# Patient Record
Sex: MALE | Race: OTHER | Employment: FULL TIME | URBAN - METROPOLITAN AREA
[De-identification: names, ages, dates, MRNs, and addresses within clinical notes are randomized per-mention and may not be internally consistent; named-entity substitution may affect disease eponyms.]

---

## 2017-03-27 ENCOUNTER — HOSPITAL ENCOUNTER (OUTPATIENT)
Dept: GENERAL RADIOLOGY | Age: 67
Discharge: HOME | End: 2017-03-27
Attending: PHYSICIAN ASSISTANT

## 2017-03-27 ENCOUNTER — OFFICE VISIT (OUTPATIENT)
Dept: OCCUPATIONAL MEDICINE | Age: 67
End: 2017-03-27
Attending: PHYSICIAN ASSISTANT

## 2017-03-27 DIAGNOSIS — S46.912A LEFT SHOULDER STRAIN, INITIAL ENCOUNTER: ICD-10-CM

## 2017-03-27 DIAGNOSIS — S46.912A LEFT SHOULDER STRAIN, INITIAL ENCOUNTER: Primary | ICD-10-CM

## 2017-03-27 PROCEDURE — 73030 X-RAY EXAM OF SHOULDER: CPT

## 2017-03-27 RX ORDER — CYCLOBENZAPRINE HCL 10 MG
10 TABLET ORAL NIGHTLY
Qty: 20 TABLET | Refills: 0 | Status: SHIPPED | OUTPATIENT
Start: 2017-03-27 | End: 2018-01-22

## 2017-03-30 ENCOUNTER — OFFICE VISIT (OUTPATIENT)
Dept: OCCUPATIONAL MEDICINE | Age: 67
End: 2017-03-30
Attending: PHYSICIAN ASSISTANT
Payer: OTHER MISCELLANEOUS

## 2017-03-30 DIAGNOSIS — M24.812 INTERNAL DERANGEMENT OF LEFT SHOULDER: ICD-10-CM

## 2017-03-30 DIAGNOSIS — S46.912D LEFT SHOULDER STRAIN, SUBSEQUENT ENCOUNTER: Primary | ICD-10-CM

## 2017-04-25 ENCOUNTER — OFFICE VISIT (OUTPATIENT)
Dept: OCCUPATIONAL MEDICINE | Age: 67
End: 2017-04-25
Attending: FAMILY MEDICINE

## 2017-04-25 DIAGNOSIS — M75.102 TEAR OF LEFT SUPRASPINATUS TENDON: Primary | ICD-10-CM

## 2017-04-25 DIAGNOSIS — M67.922 TENDINOPATHY OF LEFT BICEPS: ICD-10-CM

## 2017-04-25 DIAGNOSIS — M67.912 TENDINOPATHY OF ROTATOR CUFF, LEFT: ICD-10-CM

## 2017-05-31 PROBLEM — M75.02 ADHESIVE CAPSULITIS OF LEFT SHOULDER: Status: ACTIVE | Noted: 2017-05-31

## 2017-05-31 PROBLEM — M19.012 PRIMARY OSTEOARTHRITIS OF LEFT SHOULDER: Status: ACTIVE | Noted: 2017-05-31

## 2017-05-31 PROBLEM — M75.122 COMPLETE TEAR OF LEFT ROTATOR CUFF: Status: ACTIVE | Noted: 2017-05-31

## 2017-06-06 ENCOUNTER — OFFICE VISIT (OUTPATIENT)
Dept: PHYSICAL THERAPY | Age: 67
End: 2017-06-06
Attending: ORTHOPAEDIC SURGERY
Payer: OTHER MISCELLANEOUS

## 2017-06-06 DIAGNOSIS — M19.012 PRIMARY OSTEOARTHRITIS OF LEFT SHOULDER: ICD-10-CM

## 2017-06-06 DIAGNOSIS — E10.8 TYPE 1 DIABETES MELLITUS WITH COMPLICATION (HCC): ICD-10-CM

## 2017-06-06 DIAGNOSIS — M75.02 ADHESIVE CAPSULITIS OF LEFT SHOULDER: ICD-10-CM

## 2017-06-06 DIAGNOSIS — G89.29 CHRONIC LEFT SHOULDER PAIN: Primary | ICD-10-CM

## 2017-06-06 DIAGNOSIS — M25.512 CHRONIC LEFT SHOULDER PAIN: Primary | ICD-10-CM

## 2017-06-06 DIAGNOSIS — M75.122 COMPLETE TEAR OF LEFT ROTATOR CUFF: ICD-10-CM

## 2017-06-06 PROCEDURE — 97110 THERAPEUTIC EXERCISES: CPT

## 2017-06-06 PROCEDURE — 97162 PT EVAL MOD COMPLEX 30 MIN: CPT

## 2017-06-06 NOTE — PROGRESS NOTES
UPPER EXTREMITY EVALUATION:   Referring Physician: Dr. Janette Mcleod  Diagnosis: chronic left shoulder pain, type 1 DM, complete tear of L RC, adhesive capsulitis of L shoulder; OA of L shoulder     Date of Service: 6/6/2017   *daughter present for translation du drop attack, dysphagia, dysarthria, diplopia  Special Qs: R handed, No symptoms with cough, sneeze. No clicking/popping/locking/catching    Physician Appointment:6/28/17   Imaging: XRAY: Unremarkable x-rays of the left shoulder.       ASSESSMENT  Pt has sym bilaterally  Resisted Abduction with ER: R: 5/5; L: 3+/5 *pain and compensations    Today’s Treatment and Response: Patient education provided on pathology, HEP recommendations, abduction AAROM in sidelying, impairments and goals for therapy, weakness.   Pa referral. Please co-sign or sign and return this letter via fax as soon as possible to [phone].  If you have any questions, please contact me at Dept: [phone]    Sincerely,  Electronically signed by therapist: Alistair Bruno, PT    [de-identified] ce

## 2017-06-07 ENCOUNTER — OFFICE VISIT (OUTPATIENT)
Dept: PHYSICAL THERAPY | Age: 67
End: 2017-06-07
Attending: ORTHOPAEDIC SURGERY
Payer: OTHER MISCELLANEOUS

## 2017-06-07 PROCEDURE — 97110 THERAPEUTIC EXERCISES: CPT

## 2017-06-07 NOTE — PROGRESS NOTES
Dx: chronic left shoulder pain, type 1 DM, complete tear of L RC, adhesive capsulitis of L shoulder; OA of L shoulder              Authorized # of Visits:  12 auth w/c         Next MD visit: 6/28/17    Fall Risk: standard        Precautions: none weight; 10 reps 1 lbs *small amt pain         Standing yellow band extension 20 reps         Seated thoracic towel extension 10 sec hold 10 reps          Pt education: mechanics for exercises, benefits of exercises         HEP (written handouts provided) a

## 2017-06-08 ENCOUNTER — OFFICE VISIT (OUTPATIENT)
Dept: PHYSICAL THERAPY | Age: 67
End: 2017-06-08
Attending: ORTHOPAEDIC SURGERY
Payer: OTHER MISCELLANEOUS

## 2017-06-08 PROCEDURE — 97110 THERAPEUTIC EXERCISES: CPT

## 2017-06-08 NOTE — PROGRESS NOTES
Dx: chronic left shoulder pain, type 1 DM, complete tear of L RC, adhesive capsulitis of L shoulder; OA of L shoulder              Authorized # of Visits:  12 auth w/c           Next MD visit: 6/28/17   Fall Risk: standard         Precautions: none this course of care. Thank you for your referral. If you have any questions, please contact me at Dept: [phone].     Sincerely,  Electronically signed by therapist: Rina Andrews PT      Date: 6/7/2017  Tx#: 2/12 Date: 6/8/2017   Tx#: 3/12 Date:

## 2017-06-12 ENCOUNTER — OFFICE VISIT (OUTPATIENT)
Dept: PHYSICAL THERAPY | Age: 67
End: 2017-06-12
Attending: ORTHOPAEDIC SURGERY
Payer: OTHER MISCELLANEOUS

## 2017-06-12 PROCEDURE — 97530 THERAPEUTIC ACTIVITIES: CPT

## 2017-06-12 PROCEDURE — 97110 THERAPEUTIC EXERCISES: CPT

## 2017-06-12 NOTE — PROGRESS NOTES
Dx: chronic left shoulder pain, type 1 DM, complete tear of L RC, adhesive capsulitis of L shoulder; OA of L shoulder              Authorized # of Visits:  12 auth w/c           Next MD visit: 6/28/17   Fall Risk: standard         Precautions: none Date:   Tx#: 6/ Date: Tx#: 7/ Date:    Tx#: 8/   Pulley warm up; flexion 3 min; abduction 3 min  Arm bike level 3 hills 5 in 2.5 min forward; 2.5 min back  X 5 min        Scapular retraction 10 reps x 2 sets X 10 reps  Pulley row 25 lbs 20 reps; 30 lbs 20

## 2017-06-13 ENCOUNTER — APPOINTMENT (OUTPATIENT)
Dept: PHYSICAL THERAPY | Age: 67
End: 2017-06-13
Attending: ORTHOPAEDIC SURGERY
Payer: OTHER MISCELLANEOUS

## 2017-06-15 ENCOUNTER — OFFICE VISIT (OUTPATIENT)
Dept: PHYSICAL THERAPY | Age: 67
End: 2017-06-15
Attending: ORTHOPAEDIC SURGERY
Payer: OTHER MISCELLANEOUS

## 2017-06-15 PROCEDURE — 97110 THERAPEUTIC EXERCISES: CPT

## 2017-06-15 PROCEDURE — 97530 THERAPEUTIC ACTIVITIES: CPT

## 2017-06-15 NOTE — PROGRESS NOTES
Dx: chronic left shoulder pain, type 1 DM, complete tear of L RC, adhesive capsulitis of L shoulder; OA of L shoulder              Authorized # of Visits:  12 auth w/c           Next MD visit: 6/28/17   Fall Risk: standard         Precautions: none course of care. Thank you for your referral. If you have any questions, please contact me at Dept: [phone].     Sincerely,  Electronically signed by therapist: Yanelis Ayers PT        Date: 6/7/2017  Tx#: 2/12 Date: 6/8/2017   Tx#: 3/12 Date: 6/12 serratus circles 10 CW 10 CCW R/L X 3 lbs 15 reps x 3 sets R/L Prone mid trap retraction 10 reps x 2 sets      HEP (written handouts provided) and pt education: Sidelying 38AROM abduction  6/7/2017: band resisted IR/ER- red band; scaption AROM- 1 lbs; band

## 2017-06-19 ENCOUNTER — OFFICE VISIT (OUTPATIENT)
Dept: PHYSICAL THERAPY | Age: 67
End: 2017-06-19
Attending: ORTHOPAEDIC SURGERY
Payer: OTHER MISCELLANEOUS

## 2017-06-19 PROCEDURE — 97110 THERAPEUTIC EXERCISES: CPT

## 2017-06-19 PROCEDURE — 97530 THERAPEUTIC ACTIVITIES: CPT

## 2017-06-19 NOTE — PROGRESS NOTES
Dx: chronic left shoulder pain, type 1 DM, complete tear of L RC, adhesive capsulitis of L shoulder; OA of L shoulder              Authorized # of Visits:  12 auth w/c           Next MD visit: 6/28/17   Fall Risk: standard         Precautions: none actively participate in planning and for this course of care. Thank you for your referral. If you have any questions, please contact me at Dept: [phone].     Sincerely,  Electronically signed by therapist: Poonam Connolly, PT        Date: 6/7/2017  T chest stretch 30 sec hold x 3 sets on the L Tricep overhead extension 2 lbs 10 reps x 2 sets     Seated thoracic towel extension 10 sec hold 10 reps  X 10 reps  Supine pec stretch 30 sec hold x 3 sets Prone scapular retraction 10 reps x 2 sets- VC and tact

## 2017-06-20 ENCOUNTER — APPOINTMENT (OUTPATIENT)
Dept: PHYSICAL THERAPY | Age: 67
End: 2017-06-20
Attending: ORTHOPAEDIC SURGERY
Payer: OTHER MISCELLANEOUS

## 2017-06-21 ENCOUNTER — OFFICE VISIT (OUTPATIENT)
Dept: PHYSICAL THERAPY | Age: 67
End: 2017-06-21
Attending: ORTHOPAEDIC SURGERY
Payer: OTHER MISCELLANEOUS

## 2017-06-21 PROCEDURE — 97530 THERAPEUTIC ACTIVITIES: CPT

## 2017-06-21 PROCEDURE — 97110 THERAPEUTIC EXERCISES: CPT

## 2017-06-21 NOTE — PROGRESS NOTES
Dx: chronic left shoulder pain, type 1 DM, complete tear of L RC, adhesive capsulitis of L shoulder; OA of L shoulder              Authorized # of Visits:  12 auth w/c           Next MD visit: 6/28/17   Fall Risk: standard         Precautions: none supination  Patient/Family/Caregiver was advised of these findings, precautions, and treatment options and has agreed to actively participate in planning and for this course of care.     Thank you for your referral. If you have any questions, please contact 20 reps x 2 sets X red band 20 reps X green band 15 reps Standing bicep curl 4 lbs 15 reps; 5 lbs 15 reps     Scaption 10 reps no weight; 10 reps 1 lbs *small amt pain X 1 lbs 10 reps x 2 sets 4 lb ball tap overhead 10 reps each direction  Supine Posterior

## 2017-06-22 ENCOUNTER — APPOINTMENT (OUTPATIENT)
Dept: PHYSICAL THERAPY | Age: 67
End: 2017-06-22
Attending: ORTHOPAEDIC SURGERY
Payer: OTHER MISCELLANEOUS

## 2017-06-26 ENCOUNTER — OFFICE VISIT (OUTPATIENT)
Dept: PHYSICAL THERAPY | Age: 67
End: 2017-06-26
Attending: ORTHOPAEDIC SURGERY
Payer: OTHER MISCELLANEOUS

## 2017-06-26 PROCEDURE — 97110 THERAPEUTIC EXERCISES: CPT

## 2017-06-26 NOTE — PROGRESS NOTES
Dx: chronic left shoulder pain, type 1 DM, complete tear of L RC, adhesive capsulitis of L shoulder; OA of L shoulder              Authorized # of Visits:  12 auth w/c           Next MD visit: 6/28/17   Fall Risk: standard         Precautions: none don deodorant, don/doff shirts, and wash hair (8 visits)- MET  · Pt will increase shoulder AROM to 85 deg ER and 90 deg ABD to reach and fasten seatbelt (8 visits)- MET  · Pt will improve shoulder strength throughout to 4-/5 to improve function with ADLs i - Diagonals red band D1 flexion/extension 10 reps x 2 sets Wall walks with red band 10 reps R/L x 2 sets  Manual GH on the L Posterior+inferior  glide gr III-IV 10 reps x 2 sets  Sidelying open book stretch 15 reps R/L   Chest stretch in doorway 10 sec hol education: mechanics for exercises, benefits of exercises Supine serratus punches 3 lbs 15 reps; serratus circles 10 CW 10 CCW R/L X 3 lbs 15 reps x 3 sets R/L Prone mid trap retraction 10 reps x 2 sets Standing green band bicep curl 10 reps x 2 sets Prone

## 2017-07-13 ENCOUNTER — APPOINTMENT (OUTPATIENT)
Dept: PHYSICAL THERAPY | Age: 67
End: 2017-07-13
Attending: INTERNAL MEDICINE
Payer: COMMERCIAL

## 2017-07-18 ENCOUNTER — OFFICE VISIT (OUTPATIENT)
Dept: PHYSICAL THERAPY | Age: 67
End: 2017-07-18
Attending: INTERNAL MEDICINE
Payer: COMMERCIAL

## 2017-07-18 PROCEDURE — 97140 MANUAL THERAPY 1/> REGIONS: CPT

## 2017-07-18 PROCEDURE — 97110 THERAPEUTIC EXERCISES: CPT

## 2017-07-18 NOTE — PROGRESS NOTES
Dx: chronic left shoulder pain, type 1 DM, complete tear of L RC, adhesive capsulitis of L shoulder; OA of L shoulder              Authorized # of Visits:  12 auth w/c           Next MD visit: 6/28/17   Fall Risk: standard         Precautions: none tolerate reaching behind back for donning shirt/pants with pain <3/10. To be met in 4 visits. · Updated: Pt will tolerant ability to lift box ~20lb to waist height with pain <3/10 To be met in 4 visits.      Plan: Continue per original plan of care to pro lift off 25 reps  - Diagonals red band D1 flexion/extension 10 reps x 2 sets Wall walks with red band 10 reps R/L x 2 sets  Manual GH on the L Posterior+inferior  glide gr III-IV 10 reps x 2 sets  Sidelying open book stretch 15 reps R/L Sidelying ER with 2 sec hold 10 reps  X 10 reps  Supine pec stretch 30 sec hold x 3 sets Prone scapular retraction 10 reps x 2 sets- VC and tactile cues Bicep curl 4 lbs 10 reps x 2 sets Shoulder press 2 lbs 15 reps x 2 sets Prone mid trap retraction 2 lbs 10 reps; 1 lbs 10 r

## 2017-07-19 ENCOUNTER — APPOINTMENT (OUTPATIENT)
Dept: PHYSICAL THERAPY | Age: 67
End: 2017-07-19
Attending: INTERNAL MEDICINE
Payer: COMMERCIAL

## 2017-07-24 ENCOUNTER — OFFICE VISIT (OUTPATIENT)
Dept: PHYSICAL THERAPY | Age: 67
End: 2017-07-24
Attending: INTERNAL MEDICINE
Payer: COMMERCIAL

## 2017-07-24 PROCEDURE — 97140 MANUAL THERAPY 1/> REGIONS: CPT

## 2017-07-24 PROCEDURE — 97110 THERAPEUTIC EXERCISES: CPT

## 2017-07-24 NOTE — PROGRESS NOTES
Dx: chronic left shoulder pain, type 1 DM, complete tear of L RC, adhesive capsulitis of L shoulder; OA of L shoulder              Authorized # of Visits:  12 auth w/c           Next MD visit: 6/28/17   Fall Risk: standard         Precautions: none be met in 4 visits. Plan: Continue per original plan of care.  Plan for next session: continue posterior capsule stretch         Date: 6/7/2017  Tx#: 2/12 Date: 6/8/2017   Tx#: 3/12 Date: 6/12/2017   Tx#: 4/12 Date: 6/15/2017   Tx#: 5/12 Date: 6/19/2017 band D2 flexion/extension 10 reps x 2 sets  Green band mid trap retraction 15 reps Supine chest stretch - bicep stretch 30 sec hold x 3 sets L Manual GH on the L Posterior+inferior  glide gr III-IV 10 reps x 2 sets  X 10 reps x 2 sets each  Red band should trap retraction 2 lbs 10 reps; 1 lbs 10 reps Pt education: HEP update, handouts provided Dimitri BLUM IR 20 reps; lift off wanspencer BLUM 20 reps     Pt education: mechanics for exercises, benefits of exercises Supine serratus punches 3 lbs 15 reps; serratus cir

## 2017-07-26 ENCOUNTER — OFFICE VISIT (OUTPATIENT)
Dept: PHYSICAL THERAPY | Age: 67
End: 2017-07-26
Attending: INTERNAL MEDICINE
Payer: COMMERCIAL

## 2017-07-26 PROCEDURE — 97110 THERAPEUTIC EXERCISES: CPT

## 2017-07-26 PROCEDURE — 97140 MANUAL THERAPY 1/> REGIONS: CPT

## 2017-07-26 NOTE — PROGRESS NOTES
Dx: chronic left shoulder pain, type 1 DM, complete tear of L RC, adhesive capsulitis of L shoulder; OA of L shoulder              Authorized # of Visits:  12 auth w/c           Next MD visit: 6/28/17   Fall Risk: standard         Precautions: none will tolerate reaching behind back for donning shirt/pants with pain <3/10. To be met in 4 visits. · Updated: Pt will tolerant ability to lift box ~20lb to waist height with pain <3/10 To be met in 4 visits.      Plan: Continue per original plan of care f VCs for keeping elbow elevated Standing bent over horizontal abduction 10 reps 2 lbs 2 sets R/L Standing abduction 10 reps x 2 sets Standing scaption 2 lbs 15 reps x 2 sets    X green band 15 reps Standing bicep curl 4 lbs 15 reps; 5 lbs 15 reps  X 5 lbs 1 home  6/12/2017: wall slides with lift off; pec stretch 30 sec hold x 3 sets  6/19/2017: bicep curls; tricep extension   6/26/2017 Open book stretch   7/18/2017: bent over horizontal abduction; overhead press; and sidelying ER  7/26/2017: chest stretch UL

## 2017-07-31 ENCOUNTER — OFFICE VISIT (OUTPATIENT)
Dept: PHYSICAL THERAPY | Age: 67
End: 2017-07-31
Attending: ORTHOPAEDIC SURGERY
Payer: OTHER MISCELLANEOUS

## 2017-07-31 PROCEDURE — 97110 THERAPEUTIC EXERCISES: CPT

## 2017-07-31 PROCEDURE — 97530 THERAPEUTIC ACTIVITIES: CPT

## 2017-07-31 NOTE — PROGRESS NOTES
Dx: chronic left shoulder pain, type 1 DM, complete tear of L RC, adhesive capsulitis of L shoulder; OA of L shoulder              Authorized # of Visits:  12 auth w/c           Next MD visit: Tuesday  Fall Risk: standard         Precautions: none maintain progress achieved in PT (ongoing) ongoing  · Updated: Pt will tolerate reaching behind back for donning shirt/pants with pain <3/10. To be met in 4 visits.    · Updated: Pt will tolerant ability to lift box ~20lb to waist height with pain <3/10 To IR/ER 5 reps each 30 sec hold with various deg of adduction to reduce pain but at 90 deg abduction L   Overhead press 2 lbs 20 reps   Green band mid trap retraction 15 reps Supine chest stretch - bicep stretch 30 sec hold x 3 sets L Manual GH on the L Post lbs 20 reps x 2 sets    Prone scapular retraction 20 reps x 2 sets Prone \"W\" with no weight 10 reps x 2 sets Box lift and carry 15 lbs 10 reps total  X 20 lbs, little  -  Wall push ups 25 reps- education on indep progression       Wall push ups 20 reps

## 2017-11-17 ENCOUNTER — HOSPITAL ENCOUNTER (OUTPATIENT)
Dept: CT IMAGING | Age: 67
Discharge: HOME | End: 2017-11-17
Attending: INTERNAL MEDICINE
Payer: COMMERCIAL

## 2017-11-17 DIAGNOSIS — K57.92 DIVERTICULITIS: ICD-10-CM

## 2017-11-17 PROCEDURE — 74177 CT ABD & PELVIS W/CONTRAST: CPT | Performed by: INTERNAL MEDICINE

## 2017-11-17 PROCEDURE — 82565 ASSAY OF CREATININE: CPT

## 2017-12-07 PROBLEM — E11.42 TYPE 2 DIABETES MELLITUS WITH PERIPHERAL NEUROPATHY (HCC): Status: ACTIVE | Noted: 2017-12-07

## 2017-12-07 PROBLEM — E78.5 DYSLIPIDEMIA: Status: ACTIVE | Noted: 2017-12-07

## 2017-12-07 PROBLEM — I10 ESSENTIAL HYPERTENSION: Status: ACTIVE | Noted: 2017-12-07

## 2017-12-07 PROBLEM — R74.8 ELEVATED LIVER ENZYMES: Status: ACTIVE | Noted: 2017-12-07

## 2017-12-07 PROCEDURE — 82043 UR ALBUMIN QUANTITATIVE: CPT | Performed by: INTERNAL MEDICINE

## 2017-12-07 PROCEDURE — 82570 ASSAY OF URINE CREATININE: CPT | Performed by: INTERNAL MEDICINE

## 2017-12-07 PROCEDURE — 36415 COLL VENOUS BLD VENIPUNCTURE: CPT | Performed by: INTERNAL MEDICINE

## 2018-01-22 ENCOUNTER — HOSPITAL ENCOUNTER (EMERGENCY)
Facility: HOSPITAL | Age: 68
Discharge: HOME | End: 2018-01-22
Attending: EMERGENCY MEDICINE
Payer: COMMERCIAL

## 2018-01-22 ENCOUNTER — APPOINTMENT (OUTPATIENT)
Dept: GENERAL RADIOLOGY | Facility: HOSPITAL | Age: 68
End: 2018-01-22
Attending: EMERGENCY MEDICINE
Payer: COMMERCIAL

## 2018-01-22 VITALS
OXYGEN SATURATION: 95 % | TEMPERATURE: 98 F | DIASTOLIC BLOOD PRESSURE: 80 MMHG | RESPIRATION RATE: 18 BRPM | HEIGHT: 62 IN | HEART RATE: 76 BPM | SYSTOLIC BLOOD PRESSURE: 120 MMHG | BODY MASS INDEX: 32.2 KG/M2 | WEIGHT: 175 LBS

## 2018-01-22 DIAGNOSIS — M54.16 LUMBAR RADICULOPATHY: Primary | ICD-10-CM

## 2018-01-22 PROCEDURE — 99283 EMERGENCY DEPT VISIT LOW MDM: CPT

## 2018-01-22 PROCEDURE — 72100 X-RAY EXAM L-S SPINE 2/3 VWS: CPT | Performed by: EMERGENCY MEDICINE

## 2018-01-22 RX ORDER — HYDROCODONE BITARTRATE AND ACETAMINOPHEN 5; 325 MG/1; MG/1
2 TABLET ORAL ONCE
Status: COMPLETED | OUTPATIENT
Start: 2018-01-22 | End: 2018-01-22

## 2018-01-22 RX ORDER — METHYLPREDNISOLONE 4 MG/1
TABLET ORAL
Qty: 1 PACKAGE | Refills: 0 | Status: SHIPPED | OUTPATIENT
Start: 2018-01-22 | End: 2018-01-27

## 2018-01-22 RX ORDER — HYDROCODONE BITARTRATE AND ACETAMINOPHEN 5; 325 MG/1; MG/1
1-2 TABLET ORAL EVERY 4 HOURS PRN
Qty: 12 TABLET | Refills: 0 | Status: SHIPPED | OUTPATIENT
Start: 2018-01-22

## 2018-01-23 NOTE — ED INITIAL ASSESSMENT (HPI)
Pt here for lower back pain that is traveling to left leg that pt states is numb now. Pt states pain on and off for pt 4-5 months.

## 2018-01-23 NOTE — ED PROVIDER NOTES
Patient Seen in: [facility] Emergency Department    History   Patient presents with:  Back Pain (musculoskeletal)    Stated Complaint: LOW BACK PAIN/LEG PAIN    HPI    78-year-old male here with his daughter concerned about lower back pain.   He has n/a    Current:/80   Pulse 76   Temp 98.1 °F (36.7 °C) (Temporal)   Resp 18   Ht 157.5 cm (5' 2\")   Wt 79.4 kg   SpO2 95%   BMI 32.01 kg/m²         Physical Exam    Physical Exam   Constitutional: Pt is oriented to person, place, and time.  Pt appear Osteopenia. Dictated by: Kan Arrington MD on 1/22/2018 at 21:12     Approved by: Kan Arrington MD              Clinically his presentation not concerning for cauda equina syndrome or any serious emergent spinal pathology.     We will place on Med[facility]

## 2018-02-08 ENCOUNTER — TELEPHONE (OUTPATIENT)
Dept: SURGERY | Facility: CLINIC | Age: 68
End: 2018-02-08

## 2018-09-28 PROCEDURE — 82570 ASSAY OF URINE CREATININE: CPT | Performed by: INTERNAL MEDICINE

## 2018-09-28 PROCEDURE — 82043 UR ALBUMIN QUANTITATIVE: CPT | Performed by: INTERNAL MEDICINE

## 2020-09-08 ENCOUNTER — APPOINTMENT (OUTPATIENT)
Dept: GENERAL RADIOLOGY | Facility: HOSPITAL | Age: 70
End: 2020-09-08
Attending: EMERGENCY MEDICINE

## 2020-09-08 ENCOUNTER — HOSPITAL ENCOUNTER (EMERGENCY)
Facility: HOSPITAL | Age: 70
Discharge: HOME | End: 2020-09-08
Attending: EMERGENCY MEDICINE

## 2020-09-08 VITALS
SYSTOLIC BLOOD PRESSURE: 147 MMHG | OXYGEN SATURATION: 96 % | WEIGHT: 176.38 LBS | RESPIRATION RATE: 12 BRPM | HEART RATE: 71 BPM | BODY MASS INDEX: 29 KG/M2 | DIASTOLIC BLOOD PRESSURE: 72 MMHG | TEMPERATURE: 99 F

## 2020-09-08 DIAGNOSIS — S70.01XA CONTUSION OF RIGHT HIP, INITIAL ENCOUNTER: ICD-10-CM

## 2020-09-08 DIAGNOSIS — S93.401A MILD SPRAIN OF RIGHT ANKLE, INITIAL ENCOUNTER: Primary | ICD-10-CM

## 2020-09-08 DIAGNOSIS — S83.91XA SPRAIN OF RIGHT KNEE, UNSPECIFIED LIGAMENT, INITIAL ENCOUNTER: ICD-10-CM

## 2020-09-08 LAB
AMPHET UR QL SCN: NEGATIVE
BENZODIAZ UR QL SCN: NEGATIVE
CANNABINOIDS UR QL SCN: NEGATIVE
COCAINE UR QL: NEGATIVE
CREAT UR-SCNC: 226 MG/DL
MDMA UR QL SCN: NEGATIVE
OPIATES UR QL SCN: NEGATIVE
OXYCODONE UR QL SCN: NEGATIVE

## 2020-09-08 PROCEDURE — 73502 X-RAY EXAM HIP UNI 2-3 VIEWS: CPT | Performed by: EMERGENCY MEDICINE

## 2020-09-08 PROCEDURE — 73562 X-RAY EXAM OF KNEE 3: CPT | Performed by: EMERGENCY MEDICINE

## 2020-09-08 PROCEDURE — 73610 X-RAY EXAM OF ANKLE: CPT | Performed by: EMERGENCY MEDICINE

## 2020-09-08 PROCEDURE — 99284 EMERGENCY DEPT VISIT MOD MDM: CPT

## 2020-09-08 PROCEDURE — 80307 DRUG TEST PRSMV CHEM ANLYZR: CPT | Performed by: EMERGENCY MEDICINE

## 2020-09-08 RX ORDER — ACETAMINOPHEN 500 MG
1000 TABLET ORAL ONCE
Status: COMPLETED | OUTPATIENT
Start: 2020-09-08 | End: 2020-09-08

## 2020-09-08 NOTE — ED INITIAL ASSESSMENT (HPI)
Pt presents to ed with right lower back and right knee pain after slipping outside, pt has swelling to right knee with + distal pulses

## 2020-09-08 NOTE — ED PROVIDER NOTES
Patient Seen in: [facility] Emergency Department      History   Patient presents with:  Fall    Stated Complaint: complains of leg pain, possibly fell. Work related injury, needs drug testing.  *    HPI    24-year-old  male comes emerge from distress. Vital signs are stable he is afebrile  Patient has no lumbar or thoracic spine pain noted to palpation. Patient has pain noted to palpation the right hip. He has no pain with logrolling of the right hip.   There is a sizable knee effusion of th Marked vascular calcification noted. No acute fracture. Normal joint spaces for age. There is a 5 by 4 mm subchondral cysts involving the lateral right talar dome consistent with remote injury/degenerative change. Mild diffuse soft   tissue swelling.

## 2020-09-09 ENCOUNTER — OFFICE VISIT (OUTPATIENT)
Dept: OCCUPATIONAL MEDICINE | Age: 70
End: 2020-09-09
Attending: FAMILY MEDICINE
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
## 2020-09-09 RX ORDER — NAPROXEN 500 MG/1
500 TABLET ORAL 2 TIMES DAILY WITH MEALS
Qty: 30 TABLET | Refills: 1 | Status: SHIPPED | OUTPATIENT
Start: 2020-09-09 | End: 2020-10-09

## 2020-09-16 ENCOUNTER — OFFICE VISIT (OUTPATIENT)
Dept: OCCUPATIONAL MEDICINE | Age: 70
End: 2020-09-16
Attending: EMERGENCY MEDICINE

## 2020-09-23 ENCOUNTER — OFFICE VISIT (OUTPATIENT)
Dept: OCCUPATIONAL MEDICINE | Age: 70
End: 2020-09-23
Attending: EMERGENCY MEDICINE

## 2020-09-23 DIAGNOSIS — R52 PAIN: Primary | ICD-10-CM

## 2020-09-29 ENCOUNTER — TELEPHONE (OUTPATIENT)
Dept: PHYSICAL THERAPY | Age: 70
End: 2020-09-29

## 2020-09-29 ENCOUNTER — OFFICE VISIT (OUTPATIENT)
Dept: PHYSICAL THERAPY | Age: 70
End: 2020-09-29
Attending: EMERGENCY MEDICINE
Payer: OTHER MISCELLANEOUS

## 2020-09-29 DIAGNOSIS — R52 PAIN: ICD-10-CM

## 2020-09-29 PROCEDURE — 97161 PT EVAL LOW COMPLEX 20 MIN: CPT | Performed by: PHYSICAL THERAPIST

## 2020-09-29 PROCEDURE — 97110 THERAPEUTIC EXERCISES: CPT | Performed by: PHYSICAL THERAPIST

## 2020-09-29 NOTE — PROGRESS NOTES
PHYSICAL THERAPY  EVALUATION:    Referring Physician: Luis Lofton    DX Code: Right knee and right ankle pain     PT DX: Right knee and right ankle pain    PCP: Ted Tyler MD     Age: 65year old  Occupation: [employer]     DOI: sep 8  DOS: - DTRs:   (R) (L)   Patellar Tendon L2-4: + +   Achilles S1-2: + +          Sensation:  Intact to light touch  Dural Signs: + R seated slump and SLR  LE Pulses: present   Palpation Summary: tender over R lateral malleolus, around R patella, R PSIS and R minutes before pain increases significantly. Flexibility Summary: tight quads, hip flexors and hamstrings     Signs and symptoms are consistent with patient’s diagnosis.   Functional impairments include:Difficulty or limited tolerance for ADLs including [id number]   • Order #: [id number]   • Order #: [id number]   • Order #: [id number]   • Order #: [id number]   • Order #: [id number]   • Order #: [id number]   • Order #: [id number]        Past Medical HX:   Past Medical History:   Diagnosis Date   • DIABETES    • Eso

## 2020-10-01 ENCOUNTER — OFFICE VISIT (OUTPATIENT)
Dept: PHYSICAL THERAPY | Age: 70
End: 2020-10-01
Attending: EMERGENCY MEDICINE
Payer: OTHER MISCELLANEOUS

## 2020-10-01 DIAGNOSIS — R52 PAIN: ICD-10-CM

## 2020-10-01 PROCEDURE — 97110 THERAPEUTIC EXERCISES: CPT | Performed by: PHYSICAL THERAPIST

## 2020-10-01 NOTE — PROGRESS NOTES
Dx: Right knee and right ankle pain          Authorized # of Visits:  6         Next MD visit: none scheduled  Fall Risk: standard         Precautions: n/a           Medication Changes since last visit?: No  Subjective: doing HEP, feeling better, R knee is get up hourly at work.     Skilled Services: TE, MT    Charges: TE3       Total Timed Treatment: 46 min  Total Treatment Time: 48 min

## 2020-10-07 ENCOUNTER — OFFICE VISIT (OUTPATIENT)
Dept: PHYSICAL THERAPY | Age: 70
End: 2020-10-07
Attending: EMERGENCY MEDICINE
Payer: OTHER MISCELLANEOUS

## 2020-10-07 ENCOUNTER — OFFICE VISIT (OUTPATIENT)
Dept: OCCUPATIONAL MEDICINE | Age: 70
End: 2020-10-07
Attending: FAMILY MEDICINE
Payer: COMMERCIAL

## 2020-10-07 DIAGNOSIS — R52 PAIN: ICD-10-CM

## 2020-10-07 PROCEDURE — 97110 THERAPEUTIC EXERCISES: CPT | Performed by: PHYSICAL THERAPIST

## 2020-10-07 NOTE — PROGRESS NOTES
Dx: Right knee and right ankle pain          Authorized # of Visits:  6         Next MD visit: 10/21/20  Fall Risk: standard         Precautions: n/a           Medication Changes since last visit?: No     Subjective: doing HEP, feeling better, R knee is th lifting 20# repeatedly. · Pt. will be independent with home exercise program and self management. Plan: cont HEP, avoid sitting in soft couches, support LB when seated. cont to get up hourly at work.     Skilled Services: TAVIA MENDEZ    Charges: Natalia Byrne

## 2020-10-09 ENCOUNTER — OFFICE VISIT (OUTPATIENT)
Dept: PHYSICAL THERAPY | Age: 70
End: 2020-10-09
Attending: EMERGENCY MEDICINE
Payer: OTHER MISCELLANEOUS

## 2020-10-09 DIAGNOSIS — R52 PAIN: ICD-10-CM

## 2020-10-09 PROCEDURE — 97110 THERAPEUTIC EXERCISES: CPT | Performed by: PHYSICAL THERAPIST

## 2020-10-09 NOTE — PROGRESS NOTES
Dx: Right knee and right ankle pain          Authorized # of Visits:  6         Next MD visit: 10/21/20  Fall Risk: standard         Precautions: n/a           Medication Changes since last visit?: No     Subjective: doing HEP, feeling better, R LB and R k to tolerate sitting, driving, standing and walking for 20 minutes without increased symptoms. · Pt. will be able to ambulate without limp, bend to  objects from the floor without pain, squat as needed.   · Pt will be able to perform work related tas

## 2020-10-13 ENCOUNTER — OFFICE VISIT (OUTPATIENT)
Dept: PHYSICAL THERAPY | Age: 70
End: 2020-10-13
Attending: EMERGENCY MEDICINE
Payer: OTHER MISCELLANEOUS

## 2020-10-13 DIAGNOSIS — R52 PAIN: ICD-10-CM

## 2020-10-13 PROCEDURE — 97110 THERAPEUTIC EXERCISES: CPT | Performed by: PHYSICAL THERAPIST

## 2020-10-13 NOTE — PROGRESS NOTES
Dx: Right knee and right ankle pain          Authorized # of Visits:  6         Next MD visit: 10/21/20  Fall Risk: standard         Precautions: n/a           Medication Changes since last visit?: No     Subjective: doing HEP, feeling better, R LB and R k Assessment: responding well, decreased sensitivity, improving function. Goals:   to be reached in 6-8 visits. · Pt. will report decreased pain from 8/10 to 2/10 at worst, to allow pt to stand for 20+ minutes at work as needed.   · Pt. will be ab

## 2020-10-15 ENCOUNTER — OFFICE VISIT (OUTPATIENT)
Dept: PHYSICAL THERAPY | Age: 70
End: 2020-10-15
Attending: EMERGENCY MEDICINE
Payer: OTHER MISCELLANEOUS

## 2020-10-15 DIAGNOSIS — R52 PAIN: ICD-10-CM

## 2020-10-15 PROCEDURE — 97110 THERAPEUTIC EXERCISES: CPT | Performed by: PHYSICAL THERAPIST

## 2020-10-15 NOTE — PROGRESS NOTES
Dx: Right knee and right ankle pain          Authorized # of Visits:  6         Next MD visit: 10/21/20  Fall Risk: standard         Precautions: n/a           Medication Changes since last visit?: No     Subjective: doing HEP, feeling better, R LB painfre Floor to waist lift 10# x 5 Floor to waist lift 20# x 5          Assessment: responding well, no pain post session, improving function. Goals:   to be reached in 6-8 visits.   · Pt. will report decreased pain from 8/10 to 2/10 at worst, to allow pt to

## 2020-10-19 ENCOUNTER — OFFICE VISIT (OUTPATIENT)
Dept: PHYSICAL THERAPY | Age: 70
End: 2020-10-19
Attending: EMERGENCY MEDICINE
Payer: OTHER MISCELLANEOUS

## 2020-10-19 DIAGNOSIS — R52 PAIN: ICD-10-CM

## 2020-10-19 PROCEDURE — 97110 THERAPEUTIC EXERCISES: CPT | Performed by: PHYSICAL THERAPIST

## 2020-10-19 NOTE — PROGRESS NOTES
TENTATIVE DISCHARGE NOTE    Dx: Right knee and right ankle pain          Authorized # of Visits:  6         Next MD visit: 10/21/20  Fall Risk: standard         Precautions: n/a           Medication Changes since last visit?: No     Subjective: doing HEP, spine x 2 min, dec tension Man rot mobs in ext to lumbar spine x 2 min, dec tension Man rot mobs in ext to lumbar spine x 2 min, dec tension     Prone HP to LB x 13 min Floor to waist lift 10# x 5 Floor to waist lift 20# x 5  Floor to waist lift 20# x 5

## 2020-10-21 ENCOUNTER — OFFICE VISIT (OUTPATIENT)
Dept: OCCUPATIONAL MEDICINE | Age: 70
End: 2020-10-21
Attending: FAMILY MEDICINE

## 2020-10-22 ENCOUNTER — APPOINTMENT (OUTPATIENT)
Dept: PHYSICAL THERAPY | Age: 70
End: 2020-10-22
Attending: EMERGENCY MEDICINE
Payer: OTHER MISCELLANEOUS

## 2020-12-23 PROBLEM — U07.1 COVID-19: Status: ACTIVE | Noted: 2020-12-23

## 2021-05-19 ENCOUNTER — APPOINTMENT (OUTPATIENT)
Dept: GENERAL RADIOLOGY | Facility: HOSPITAL | Age: 71
End: 2021-05-19
Attending: EMERGENCY MEDICINE
Payer: COMMERCIAL

## 2021-05-19 ENCOUNTER — APPOINTMENT (OUTPATIENT)
Dept: ULTRASOUND IMAGING | Facility: HOSPITAL | Age: 71
End: 2021-05-19
Attending: EMERGENCY MEDICINE
Payer: COMMERCIAL

## 2021-05-19 ENCOUNTER — HOSPITAL ENCOUNTER (EMERGENCY)
Facility: HOSPITAL | Age: 71
Discharge: HOME | End: 2021-05-19
Attending: EMERGENCY MEDICINE
Payer: COMMERCIAL

## 2021-05-19 VITALS
BODY MASS INDEX: 27.99 KG/M2 | SYSTOLIC BLOOD PRESSURE: 139 MMHG | TEMPERATURE: 98 F | WEIGHT: 168 LBS | HEIGHT: 65 IN | HEART RATE: 58 BPM | RESPIRATION RATE: 18 BRPM | OXYGEN SATURATION: 96 % | DIASTOLIC BLOOD PRESSURE: 70 MMHG

## 2021-05-19 DIAGNOSIS — R10.9 FLANK PAIN: Primary | ICD-10-CM

## 2021-05-19 PROCEDURE — 99285 EMERGENCY DEPT VISIT HI MDM: CPT

## 2021-05-19 PROCEDURE — 80053 COMPREHEN METABOLIC PANEL: CPT

## 2021-05-19 PROCEDURE — 81003 URINALYSIS AUTO W/O SCOPE: CPT | Performed by: EMERGENCY MEDICINE

## 2021-05-19 PROCEDURE — 99284 EMERGENCY DEPT VISIT MOD MDM: CPT

## 2021-05-19 PROCEDURE — 83690 ASSAY OF LIPASE: CPT | Performed by: EMERGENCY MEDICINE

## 2021-05-19 PROCEDURE — 85025 COMPLETE CBC W/AUTO DIFF WBC: CPT

## 2021-05-19 PROCEDURE — 85025 COMPLETE CBC W/AUTO DIFF WBC: CPT | Performed by: EMERGENCY MEDICINE

## 2021-05-19 PROCEDURE — 74018 RADEX ABDOMEN 1 VIEW: CPT | Performed by: EMERGENCY MEDICINE

## 2021-05-19 PROCEDURE — 80053 COMPREHEN METABOLIC PANEL: CPT | Performed by: EMERGENCY MEDICINE

## 2021-05-19 PROCEDURE — 36415 COLL VENOUS BLD VENIPUNCTURE: CPT

## 2021-05-19 PROCEDURE — 76775 US EXAM ABDO BACK WALL LIM: CPT | Performed by: EMERGENCY MEDICINE

## 2021-05-19 RX ORDER — MELOXICAM 7.5 MG/1
7.5 TABLET ORAL DAILY
Qty: 30 TABLET | Refills: 0 | Status: SHIPPED | OUTPATIENT
Start: 2021-05-19

## 2021-05-19 NOTE — ED INITIAL ASSESSMENT (HPI)
Pt in with left abd flank pain. Pt diabetic, currently being tx for UTI. C.o continued dysuria.  Denies N/V  Having constipation

## 2021-05-21 NOTE — ED PROVIDER NOTES
Patient Seen in: [facility] Emergency Department      History   Patient presents with:  Abdomen/Flank Pain    Stated Complaint: abd pain    HPI/Subjective:   HPI    Really pleasant  gentleman presents to the emergency department he has type BMI 27.96 kg/m²         Physical Exam  Vitals and nursing note reviewed. Constitutional:       General: He is not in acute distress. Appearance: He is well-developed. He is not diaphoretic. HENT:      Head: Atraumatic.       Right Ear: External ear Abnormal; Notable for the following components:       Result Value    Glucose 117 (*)     Creatinine 0.66 (*)     BUN/CREA Ratio 24.2 (*)     Alkaline Phosphatase 118 (*)     All other components within normal limits   LIPASE - Normal   CBC WITH DIFFERENTI Normal.    HYDRONEPHROSIS:  None. CYSTS/STONES/MASSES:  None. LEFT KIDNEY     MEASUREMENTS:  10.3 x 6.1 x 4.7 cm    ECHOGENICITY:  Normal.    HYDRONEPHROSIS:  None. CYSTS/STONES/MASSES:  None.          BLADDER:  Normal.  Unremarkable appearanc does not show any significant constipation ultrasound was done that does not show clear hydronephrosis or ureteral stone                             Disposition and Plan     Clinical Impression:  Flank pain  (primary encounter diagnosis)     Disposition:

## 2021-12-14 ENCOUNTER — IMMUNIZATION (OUTPATIENT)
Dept: LAB | Facility: HOSPITAL | Age: 71
End: 2021-12-14
Attending: EMERGENCY MEDICINE
Payer: COMMERCIAL

## 2021-12-14 DIAGNOSIS — Z23 NEED FOR VACCINATION: Primary | ICD-10-CM

## 2021-12-14 PROCEDURE — 0004A SARSCOV2 VAC 30MCG/0.3ML IM: CPT

## 2022-12-19 ENCOUNTER — TELEPHONE (OUTPATIENT)
Dept: INTERNAL MEDICINE | Age: 72
End: 2022-12-19

## 2023-01-19 RX ORDER — PERPHENAZINE 16 MG/1
TABLET, FILM COATED ORAL
Qty: 50 EACH | Refills: 0 | Status: SHIPPED | OUTPATIENT
Start: 2023-01-19

## 2024-02-12 ENCOUNTER — TELEPHONE (OUTPATIENT)
Dept: INTERNAL MEDICINE | Age: 74
End: 2024-02-12

## 2024-02-13 ENCOUNTER — TELEPHONE (OUTPATIENT)
Dept: FAMILY MEDICINE | Age: 74
End: 2024-02-13

## 2024-02-20 ENCOUNTER — OFFICE VISIT (OUTPATIENT)
Dept: FAMILY MEDICINE | Age: 74
End: 2024-02-20

## 2024-02-20 VITALS
SYSTOLIC BLOOD PRESSURE: 155 MMHG | HEIGHT: 64 IN | DIASTOLIC BLOOD PRESSURE: 80 MMHG | WEIGHT: 179.34 LBS | OXYGEN SATURATION: 98 % | HEART RATE: 69 BPM | BODY MASS INDEX: 30.62 KG/M2

## 2024-02-20 DIAGNOSIS — Z00.00 ENCOUNTER FOR GENERAL ADULT MEDICAL EXAMINATION W/O ABNORMAL FINDINGS: Primary | ICD-10-CM

## 2024-02-20 DIAGNOSIS — E78.2 MIXED HYPERLIPIDEMIA: ICD-10-CM

## 2024-02-20 DIAGNOSIS — Z11.59 NEED FOR HEPATITIS C SCREENING TEST: ICD-10-CM

## 2024-02-20 DIAGNOSIS — E11.9 TYPE 2 DIABETES MELLITUS WITHOUT COMPLICATION, WITH LONG-TERM CURRENT USE OF INSULIN (CMD): ICD-10-CM

## 2024-02-20 DIAGNOSIS — Z12.11 SCREENING FOR COLON CANCER: ICD-10-CM

## 2024-02-20 DIAGNOSIS — Z79.4 TYPE 2 DIABETES MELLITUS WITHOUT COMPLICATION, WITH LONG-TERM CURRENT USE OF INSULIN (CMD): ICD-10-CM

## 2024-02-20 DIAGNOSIS — I10 ESSENTIAL HYPERTENSION: ICD-10-CM

## 2024-02-20 PROBLEM — E11.42 TYPE 2 DIABETES MELLITUS WITH PERIPHERAL NEUROPATHY (CMD): Status: ACTIVE | Noted: 2017-12-07

## 2024-02-20 PROCEDURE — 3077F SYST BP >= 140 MM HG: CPT | Performed by: FAMILY MEDICINE

## 2024-02-20 PROCEDURE — 99387 INIT PM E/M NEW PAT 65+ YRS: CPT | Performed by: FAMILY MEDICINE

## 2024-02-20 PROCEDURE — 3079F DIAST BP 80-89 MM HG: CPT | Performed by: FAMILY MEDICINE

## 2024-02-20 RX ORDER — INSULIN DEGLUDEC 200 U/ML
45 INJECTION, SOLUTION SUBCUTANEOUS NIGHTLY
COMMUNITY
Start: 2023-11-21

## 2024-02-20 RX ORDER — DAPAGLIFLOZIN 10 MG/1
10 TABLET, FILM COATED ORAL DAILY
COMMUNITY
Start: 2024-02-12

## 2024-02-20 RX ORDER — LOSARTAN POTASSIUM 50 MG/1
50 TABLET ORAL DAILY
Qty: 30 TABLET | Refills: 1 | Status: SHIPPED | OUTPATIENT
Start: 2024-02-20

## 2024-02-20 RX ORDER — ATORVASTATIN CALCIUM 80 MG/1
80 TABLET, FILM COATED ORAL DAILY
Qty: 30 TABLET | Refills: 1 | Status: SHIPPED | OUTPATIENT
Start: 2024-02-20

## 2024-02-20 ASSESSMENT — ENCOUNTER SYMPTOMS
CONSTITUTIONAL NEGATIVE: 1
RESPIRATORY NEGATIVE: 1
PSYCHIATRIC NEGATIVE: 1
NEUROLOGICAL NEGATIVE: 1
GASTROINTESTINAL NEGATIVE: 1

## 2024-02-20 ASSESSMENT — PATIENT HEALTH QUESTIONNAIRE - PHQ9
CLINICAL INTERPRETATION OF PHQ2 SCORE: NO FURTHER SCREENING NEEDED
1. LITTLE INTEREST OR PLEASURE IN DOING THINGS: NOT AT ALL
2. FEELING DOWN, DEPRESSED OR HOPELESS: NOT AT ALL
SUM OF ALL RESPONSES TO PHQ9 QUESTIONS 1 AND 2: 0
SUM OF ALL RESPONSES TO PHQ9 QUESTIONS 1 AND 2: 0

## 2024-03-13 ENCOUNTER — APPOINTMENT (OUTPATIENT)
Dept: FAMILY MEDICINE | Age: 74
End: 2024-03-13

## 2024-03-14 ENCOUNTER — TELEPHONE (OUTPATIENT)
Dept: FAMILY MEDICINE | Age: 74
End: 2024-03-14

## 2024-03-21 ENCOUNTER — TELEPHONE (OUTPATIENT)
Dept: FAMILY MEDICINE | Age: 74
End: 2024-03-21

## 2024-06-20 ENCOUNTER — APPOINTMENT (OUTPATIENT)
Dept: INTERNAL MEDICINE | Age: 74
End: 2024-06-20

## 2024-06-20 ENCOUNTER — LAB SERVICES (OUTPATIENT)
Dept: LAB | Age: 74
End: 2024-06-20

## 2024-06-20 VITALS
WEIGHT: 182.98 LBS | HEART RATE: 68 BPM | RESPIRATION RATE: 18 BRPM | OXYGEN SATURATION: 99 % | SYSTOLIC BLOOD PRESSURE: 134 MMHG | BODY MASS INDEX: 31.24 KG/M2 | DIASTOLIC BLOOD PRESSURE: 71 MMHG | HEIGHT: 64 IN

## 2024-06-20 DIAGNOSIS — I10 ESSENTIAL HYPERTENSION: ICD-10-CM

## 2024-06-20 DIAGNOSIS — E11.9 TYPE 2 DIABETES MELLITUS WITHOUT COMPLICATION, WITH LONG-TERM CURRENT USE OF INSULIN  (CMD): ICD-10-CM

## 2024-06-20 DIAGNOSIS — E11.42 TYPE 2 DIABETES MELLITUS WITH PERIPHERAL NEUROPATHY  (CMD): ICD-10-CM

## 2024-06-20 DIAGNOSIS — E78.2 MIXED HYPERLIPIDEMIA: ICD-10-CM

## 2024-06-20 DIAGNOSIS — I10 ESSENTIAL HYPERTENSION: Primary | ICD-10-CM

## 2024-06-20 DIAGNOSIS — Z23 NEED FOR VACCINATION: ICD-10-CM

## 2024-06-20 DIAGNOSIS — Z79.4 TYPE 2 DIABETES MELLITUS WITHOUT COMPLICATION, WITH LONG-TERM CURRENT USE OF INSULIN  (CMD): ICD-10-CM

## 2024-06-20 RX ORDER — LOSARTAN POTASSIUM 50 MG/1
50 TABLET ORAL DAILY
Qty: 90 TABLET | Refills: 3 | Status: SHIPPED | OUTPATIENT
Start: 2024-06-20

## 2024-06-20 RX ORDER — DULAGLUTIDE 0.75 MG/.5ML
0.75 INJECTION, SOLUTION SUBCUTANEOUS
COMMUNITY
Start: 2024-03-15

## 2024-06-20 RX ORDER — ATORVASTATIN CALCIUM 80 MG/1
80 TABLET, FILM COATED ORAL DAILY
Qty: 90 TABLET | Refills: 1 | Status: SHIPPED | OUTPATIENT
Start: 2024-06-20

## 2024-06-20 RX ORDER — LOSARTAN POTASSIUM 100 MG/1
100 TABLET ORAL DAILY
Qty: 90 TABLET | Refills: 3 | Status: SHIPPED | OUTPATIENT
Start: 2024-06-20 | End: 2024-06-20 | Stop reason: DRUGHIGH

## 2024-06-20 ASSESSMENT — ENCOUNTER SYMPTOMS
ENDOCRINE NEGATIVE: 1
NEUROLOGICAL NEGATIVE: 1
ALLERGIC/IMMUNOLOGIC NEGATIVE: 1
CONSTITUTIONAL NEGATIVE: 1
RESPIRATORY NEGATIVE: 1
EYES NEGATIVE: 1
GASTROINTESTINAL NEGATIVE: 1
HEMATOLOGIC/LYMPHATIC NEGATIVE: 1
PSYCHIATRIC NEGATIVE: 1

## 2024-06-20 ASSESSMENT — PATIENT HEALTH QUESTIONNAIRE - PHQ9
1. LITTLE INTEREST OR PLEASURE IN DOING THINGS: NOT AT ALL
SUM OF ALL RESPONSES TO PHQ9 QUESTIONS 1 AND 2: 0
SUM OF ALL RESPONSES TO PHQ9 QUESTIONS 1 AND 2: 0
CLINICAL INTERPRETATION OF PHQ2 SCORE: NO FURTHER SCREENING NEEDED
2. FEELING DOWN, DEPRESSED OR HOPELESS: NOT AT ALL

## 2024-06-20 ASSESSMENT — PAIN SCALES - GENERAL: PAINLEVEL: 0

## 2024-06-21 ENCOUNTER — TELEPHONE (OUTPATIENT)
Dept: FAMILY MEDICINE | Age: 74
End: 2024-06-21

## 2024-06-21 LAB
CREAT UR-MCNC: 160 MG/DL
MICROALBUMIN UR-MCNC: 3.98 MG/DL
MICROALBUMIN/CREAT UR: 24.9 MG/G

## 2024-08-22 NOTE — MR AVS SNAPSHOT
Marichuy Carey  1956 [facility] [address]    Your procedure is on 9/30/24 Anticipated Arrival Time 12:30 PM  [facility] [address] [facility]  Procedure Name: Radiofrequency Ablation Medial Branch, Left, Cervical, C4, C5, C6    Is a  needed?: Yes (ativan)    If you are receiving sedation (something to help you relax) or having an epidural steroid injection, you must have a responsible adult  to take you home after your procedure. If you do not have a responsible adult , your procedure may be cancelled. If you choose to take the bus, cab, Uber, etc. and do not have a responsible adult to accompany you, your procedure may be cancelled.    Marichuy is scheduled with local anesthetic. Patient is educated that it is not necessary to withhold from eating or drinking the day of their procedure.    You do not have any medications that need to be stopped for this procedure.    Medication Name and Days to Hold:  N/A    If you have a medical implant, please bring your remote control to turn off the device.   Does the patient have an AICD implant: No    The time listed above is an estimated time. Our pre-procedural department will call you 1-2 days prior to your procedure to confirm your arrival time.  Please note- if your procedure is scheduled near the end of the day, you may be asked to move your procedure to an earlier time due to changes in the schedule and/or possibly reschedule to a different date.    Please notify the office immediately if:  No longer having pain in the area of your body for which this procedure is scheduled  You develop an infection of any kind  You have a fever within 7 days of your procedure  You start any new medications since you were seen in the office by the pain management provider, including antibiotics,   Received any dose of the COVID vaccine in the past two weeks or planning on getting a dosage within the next 2 weeks     On  [address] [phone]               Thank you for choosing us for your health care visit with Marissa Zamudio PT. We are glad to serve you and happy to provide you with this summary of your visit.   Please he Inject 10 Units into the skin nightly. Commonly known as:  LANTUS           JANUVIA 100 MG Tabs   Generic drug:  SITagliptin Phosphate   Take 100 mg by mouth daily.            METFORMIN HCL   100 MG DAILY           Omeprazole 40 MG Cpdr   Take 1 capsule b the day of your procedure, you may be asked to reschedule IF:   Any irregularity in your blood pressure, blood sugar and/or heartbeat  recent or current infection   not holding certain medications as instructed  not fasting (eating or drinking) for procedures that you are getting sedation for  any other medical reason which your care team feels it is best to delay your procedure    According to [facility]’s cancellation policy, if you need to change a scheduled appointment, you must call the Pain Management Department  at least 72 hours before your appointment to cancel or reschedule.     Please contact your insurance to verify benefits/coverage. If you have any insurance changes prior to your scheduled procedure, please contact our office.     Please note that an authorization/pre-approval obtained by our team is not a guarantee of payment. Also, pain diaries may be a requirement post-procedure, in order for the next procedure to be done. Any additional financial or billing questions, you can call our Patient Contact Center at [phone].    If you have any questions regarding these instructions, please call [facility] at [phone].

## 2024-10-23 ENCOUNTER — APPOINTMENT (OUTPATIENT)
Dept: INTERNAL MEDICINE | Age: 74
End: 2024-10-23

## 2024-10-23 ENCOUNTER — TELEPHONE (OUTPATIENT)
Dept: INTERNAL MEDICINE | Age: 74
End: 2024-10-23

## 2024-10-24 ENCOUNTER — TELEPHONE (OUTPATIENT)
Dept: FAMILY MEDICINE | Age: 74
End: 2024-10-24

## 2025-02-20 ENCOUNTER — NURSE TRIAGE (OUTPATIENT)
Dept: TELEHEALTH | Age: 75
End: 2025-02-20

## 2025-02-24 ENCOUNTER — OFFICE VISIT (OUTPATIENT)
Dept: INTERNAL MEDICINE | Age: 75
End: 2025-02-24

## 2025-02-24 VITALS
HEART RATE: 72 BPM | BODY MASS INDEX: 31.93 KG/M2 | HEIGHT: 63 IN | SYSTOLIC BLOOD PRESSURE: 128 MMHG | WEIGHT: 180.23 LBS | OXYGEN SATURATION: 96 % | DIASTOLIC BLOOD PRESSURE: 67 MMHG | RESPIRATION RATE: 16 BRPM

## 2025-02-24 DIAGNOSIS — N30.00 ACUTE CYSTITIS WITHOUT HEMATURIA: Primary | ICD-10-CM

## 2025-02-24 DIAGNOSIS — E11.42 TYPE 2 DIABETES MELLITUS WITH PERIPHERAL NEUROPATHY (CMD): ICD-10-CM

## 2025-02-24 LAB
APPEARANCE, POC: CLEAR
COLOR UR: YELLOW
GLUCOSE UR-MCNC: ABNORMAL MG/DL
HGB UR QL STRIP: ABNORMAL
KETONES UR STRIP-MCNC: NEGATIVE MG/DL
LEUKOCYTE ESTERASE UR QL STRIP: NEGATIVE
NITRITE UR QL STRIP: NEGATIVE
PH UR: 5.5 [PH] (ref 5–7)
PROT UR-MCNC: ABNORMAL MG/DL
SP GR UR: 1.02 (ref 1–1.03)

## 2025-02-24 PROCEDURE — 3078F DIAST BP <80 MM HG: CPT | Performed by: INTERNAL MEDICINE

## 2025-02-24 PROCEDURE — 3074F SYST BP LT 130 MM HG: CPT | Performed by: INTERNAL MEDICINE

## 2025-02-24 PROCEDURE — 81003 URINALYSIS AUTO W/O SCOPE: CPT | Performed by: INTERNAL MEDICINE

## 2025-02-24 PROCEDURE — 99213 OFFICE O/P EST LOW 20 MIN: CPT | Performed by: INTERNAL MEDICINE

## 2025-02-24 RX ORDER — PHENAZOPYRIDINE HYDROCHLORIDE 200 MG/1
200 TABLET, FILM COATED ORAL 3 TIMES DAILY PRN
Qty: 12 TABLET | Refills: 0 | Status: SHIPPED | OUTPATIENT
Start: 2025-02-24

## 2025-02-24 RX ORDER — CIPROFLOXACIN 500 MG/1
500 TABLET, FILM COATED ORAL 2 TIMES DAILY
Qty: 14 TABLET | Refills: 0 | Status: SHIPPED | OUTPATIENT
Start: 2025-02-24 | End: 2025-03-03

## 2025-02-24 ASSESSMENT — PATIENT HEALTH QUESTIONNAIRE - PHQ9
1. LITTLE INTEREST OR PLEASURE IN DOING THINGS: NOT AT ALL
2. FEELING DOWN, DEPRESSED OR HOPELESS: NOT AT ALL
SUM OF ALL RESPONSES TO PHQ9 QUESTIONS 1 AND 2: 0
SUM OF ALL RESPONSES TO PHQ9 QUESTIONS 1 AND 2: 0
CLINICAL INTERPRETATION OF PHQ2 SCORE: NO FURTHER SCREENING NEEDED

## 2025-02-24 ASSESSMENT — ENCOUNTER SYMPTOMS
ALLERGIC/IMMUNOLOGIC NEGATIVE: 1
ABDOMINAL PAIN: 1
RESPIRATORY NEGATIVE: 1
ENDOCRINE NEGATIVE: 1
PSYCHIATRIC NEGATIVE: 1
EYES NEGATIVE: 1
FATIGUE: 1
NEUROLOGICAL NEGATIVE: 1
HEMATOLOGIC/LYMPHATIC NEGATIVE: 1

## 2025-03-06 ENCOUNTER — OFFICE VISIT (OUTPATIENT)
Dept: INTERNAL MEDICINE | Age: 75
End: 2025-03-06

## 2025-03-06 VITALS
HEIGHT: 63 IN | RESPIRATION RATE: 16 BRPM | WEIGHT: 182.21 LBS | DIASTOLIC BLOOD PRESSURE: 53 MMHG | HEART RATE: 67 BPM | BODY MASS INDEX: 32.29 KG/M2 | SYSTOLIC BLOOD PRESSURE: 120 MMHG | OXYGEN SATURATION: 95 %

## 2025-03-06 DIAGNOSIS — N39.0 CHRONIC UTI: ICD-10-CM

## 2025-03-06 DIAGNOSIS — R10.9 FLANK PAIN: ICD-10-CM

## 2025-03-06 DIAGNOSIS — Z09 FOLLOW-UP EXAMINATION: Primary | ICD-10-CM

## 2025-03-06 LAB
APPEARANCE, POC: ABNORMAL
BILIRUB UR QL STRIP: ABNORMAL
COLOR UR: ABNORMAL
GLUCOSE UR-MCNC: ABNORMAL MG/DL
HGB UR QL STRIP: NEGATIVE
KETONES UR STRIP-MCNC: NEGATIVE MG/DL
LEUKOCYTE ESTERASE UR QL STRIP: NEGATIVE
NITRITE UR QL STRIP: NEGATIVE
PH UR: 6 [PH] (ref 5–7)
PROT UR-MCNC: NEGATIVE MG/DL
SP GR UR: 1.01 (ref 1–1.03)
UROBILINOGEN UR-MCNC: ABNORMAL MG/DL (ref 0–1)

## 2025-03-06 PROCEDURE — 3078F DIAST BP <80 MM HG: CPT | Performed by: INTERNAL MEDICINE

## 2025-03-06 PROCEDURE — 3074F SYST BP LT 130 MM HG: CPT | Performed by: INTERNAL MEDICINE

## 2025-03-06 PROCEDURE — 99213 OFFICE O/P EST LOW 20 MIN: CPT | Performed by: INTERNAL MEDICINE

## 2025-03-06 RX ORDER — PHENAZOPYRIDINE HYDROCHLORIDE 200 MG/1
200 TABLET, FILM COATED ORAL 3 TIMES DAILY PRN
Qty: 20 TABLET | Refills: 1 | Status: SHIPPED | OUTPATIENT
Start: 2025-03-06

## 2025-03-06 ASSESSMENT — ENCOUNTER SYMPTOMS
EYES NEGATIVE: 1
RESPIRATORY NEGATIVE: 1
ENDOCRINE NEGATIVE: 1
NEUROLOGICAL NEGATIVE: 1
ALLERGIC/IMMUNOLOGIC NEGATIVE: 1
GASTROINTESTINAL NEGATIVE: 1
PSYCHIATRIC NEGATIVE: 1
CONSTITUTIONAL NEGATIVE: 1
HEMATOLOGIC/LYMPHATIC NEGATIVE: 1

## 2025-03-06 ASSESSMENT — PATIENT HEALTH QUESTIONNAIRE - PHQ9
SUM OF ALL RESPONSES TO PHQ9 QUESTIONS 1 AND 2: 0
1. LITTLE INTEREST OR PLEASURE IN DOING THINGS: NOT AT ALL
2. FEELING DOWN, DEPRESSED OR HOPELESS: NOT AT ALL
SUM OF ALL RESPONSES TO PHQ9 QUESTIONS 1 AND 2: 0
CLINICAL INTERPRETATION OF PHQ2 SCORE: NO FURTHER SCREENING NEEDED

## 2025-03-22 ENCOUNTER — TELEPHONE (OUTPATIENT)
Dept: INTERNAL MEDICINE | Age: 75
End: 2025-03-22

## 2025-03-22 ENCOUNTER — NURSE TRIAGE (OUTPATIENT)
Dept: TELEHEALTH | Age: 75
End: 2025-03-22

## 2025-03-27 NOTE — ED PROVIDER NOTES
Patient Seen in: [facility] [place]      History     Chief Complaint   Patient presents with    Urinary Symptoms     Stated Complaint: UTI    Subjective:   HPI    74-year-old male with a history of hyperlipidemia, history insulin-dependent diabetes presents to the immediate care for complaints of frequent urination and burning with urination.  Patient states that he is had the symptoms since February.  He saw his primary care provider.  He was given a prescription for oral antibiotics.  Patient apparently finished his course of antibiotics but has not had any improvement or resolution of his symptoms.  He followed up with his primary care doctor and had a urinalysis and was told that his urinary tract infection was resolved.  Because of the persistence of his symptoms his primary care doctor referred him to his urologist.  Patient does have an appointment with a urologist but it is not for another 3 months.  Because of the persistence of his symptoms he came here to the immediate care hoping that he could be referred and be sooner by different neurologist.    Objective:     Past Medical History:    DIABETES    Esophageal reflux    Hyperlipidemia    Type II or unspecified type diabetes mellitus without mention of complication, not stated as uncontrolled              Past Surgical History:   Procedure Laterality Date    Colonoscopy      Hernia surgery                  Social History     Socioeconomic History    Marital status:    Tobacco Use    Smoking status: Former    Smokeless tobacco: Never   Substance and Sexual Activity    Alcohol use: No    Drug use: No              Review of Systems    Positive for stated complaint: UTI  Other systems are as noted in HPI.  Constitutional and vital signs reviewed.      All other systems reviewed and negative except as noted above.    Physical Exam     ED Triage Vitals [03/27/25 0938]   /73   Pulse 68   Resp 22   Temp 98.5 °F (36.9 °C)   Temp src Oral    SpO2 97 %   O2 Device None (Room air)       Current Vitals:   Vital Signs  BP: 138/73  Pulse: 68  Resp: 22  Temp: 98.5 °F (36.9 °C)  Temp src: Oral    Oxygen Therapy  SpO2: 97 %  O2 Device: None (Room air)        Physical Exam  General: Alert and oriented. No acute distress.  HEENT: Normocephalic. No evidence of trauma. Extraocular movements are intact.  Cardiovascular exam: Regular rate and rhythm  Lungs: Clear to auscultation bilaterally.  Abdomen: Soft, nondistended, nontender.  Extremities: No evidence of deformity. No clubbing or cyanosis.  Neuro: No focal deficit is noted.    ED Course     Labs Reviewed   [facility] POCT URINALYSIS DIPSTICK - Abnormal; Notable for the following components:       Result Value    Urine Clarity Cloudy (*)     Protein urine 30 (*)     Glucose, Urine >=1000 (*)     Blood, Urine Trace-Intact (*)     All other components within normal limits   URINE CULTURE, ROUTINE     Urine dip is negative for nitrites and negative for leukocyte Estrace.  There is greater than 1000 glucose and 30 protein.  Urine culture has been sent.       MDM   Patient was screened and evaluated during this visit.   As a treating physician attending to the patient, I determined, within reasonable clinical confidence and prior to discharge, that an emergency medical condition was not or was no longer present.  There was no indication for further evaluation, treatment or admission on an emergency basis.  Comprehensive verbal and written discharge and follow-up instructions were provided to help prevent relapse or worsening.  Patient was instructed to follow-up with her primary care provider for further evaluation and treatment, but to return immediately to the ER for worsening, concerning, new, changing or persisting symptoms.  I discussed the case with the patient and they had no questions, complaints, or concerns.  Patient felt comfortable going home.    ^^Please note that this report has been produced using speech  recognition software and may contain errors related to that system including, but not limited to, errors in grammar, punctuation, and spelling, as well as words and phrases that possibly may have been recognized inappropriately.  If there are any questions or concerns, contact the dictating provider for clarification      Medical Decision Making      Disposition and Plan     Clinical Impression:  1. Dysuria         Disposition:  Discharge  3/27/2025  9:54 am    Follow-up:  Oralia Cook PA-C  [address]  [place] [id number] [phone]    Schedule an appointment as soon as possible for a visit       Geovanny Sharma MD  [address]  [facility] [address] [phone]          Faby Beverly MD  [address]  [facility] [address] [phone]          Birgit Richards PA-C  [address]  [facility] [address] [phone]                Medications Prescribed:  Current Discharge Medication List        START taking these medications    Details   phenazopyridine 200 MG Oral Tab Take 1 tablet (200 mg total) by mouth 3 (three) times daily as needed for Pain.  Qty: 6 tablet, Refills: 0                 Supplementary Documentation:

## 2025-03-27 NOTE — DISCHARGE INSTRUCTIONS
Take Pyridium 3 times a day for 2 days for treatment of urinary discomfort.  Caution as it will turn your urine orange in color for 2 days  Follow-up with [facility] urologist.  You have been provided with names of 2 different neurologist or physician assistants to be seen

## 2025-04-01 NOTE — PATIENT INSTRUCTIONS
Prostatitis  LO QUE DEBERÍAS SABER:  La próstata es sylvester glándula sexual masculina. El líquido producido por la próstata se mezcla con el esperma (de los testículos) para producir semen. La prostatitis ([id number]) es sylvester infección o inflamación de la glándula prostática. Los hombres de cualquier edad pueden tener prostatitis y pueden tenerla más de sylvester vez. La prostatitis puede ser causada por ciertas enfermedades, infecciones, procedimientos o por sylvester glándula prostática quincy. La prostatitis no es contagiosa (no se puede contagiar) a sylvester nhung sexual. Los síntomas de la prostatitis pueden incluir dolor, problemas para orinar, home en la orina y fiebre. Se pueden usar medicamentos y ciertas terapias para tratar la prostatitis. La próstata tiene el tamaño y la forma de sylvester nuez. Se encuentra frente al recto (área de los intestinos que contiene las deposiciones). La próstata envuelve la uretra y el cassandra de la vejiga.  ? La prostatitis crónica es más común en hombres mayores. Por lo general, es sylvester afección inflamatoria y no sylvester infección. Neftali, la infección bacteriana también puede causar prostatitis crónica. Puede causar dolor en el recto, la uretra, la vejiga o el escroto. También puede hacer que no pueda vaciar completamente la vejiga. Puede orinar con frecuencia o tener ardor al orinar. La prostatitis también puede causar eyaculación dolorosa, disfunción eréctil o erecciones prolongadas.  ? Las cosas que pueden desencadenar la prostatitis incluyen:  ? Sentarse por largos períodos de tiempo (especialmente en sylvester superficie dura o sentarse en cosas que tiemblan)  ? sentadillas/peso muerto  ? La prostatitis de inicio repentino (aguda) generalmente ocurre en hombres menores de 35 años. Se debe a sylvester infección bacteriana. Puede tener síntomas graves jaun fiebre, escalofríos, norm musculares y dolor en el área entre el escroto y el ano (perineo). Puede tener dificultad para orinar, o tener dolor o ardor  al orinar. Puede pricilla home o pus en la orina.    La prostatitis a veces puede tardar de 4 a 6 semanas en resolverse                Cuidados en el hogar  Estas pautas lo ayudarán a cuidarse en casa:  ? Descanse en casa hasta que la fiebre haya desaparecido y se sienta mejor.  ? Si whatley médico le da un antibiótico, tómelo exactamente jaun se lo indicaron. Tómelo hasta que se haya laura todo.  ? Evite sentarse en un ángulo de 90 grados nile largos períodos de tiempo. Es preferible estar de pie o recostado. Evite sentarse en cualquier cosa que se mueva (tractores, cortadoras de césped, viajes largos en automóvil) si es posible. Puede usar sylvester consuelo mientras está sentado para evitar sylvester presión excesiva sobre el perineo/próstata mientras está sentado.  ? Ramona muchos líquidos/agua.  ? Evite/limite los irritantes de la dieta jaun el café, el té, las bebidas carbonatadas, las cosas azucaradas, las frutas/jugos de cítricos, las comidas picantes.  ? Para aliviar el dolor, coloque bolsas de hielo en el área inflamada. Puede hacer whatley propia bolsa de hielo poniendo cubitos de hielo en sylvester bolsa de plástico sellada envuelta en sylvester toalla elver.  ? Remojarse sylvester o dos veces al día en un baño caliente o usar sylvester almohadilla térmica puede ayudar a aliviar las molestias en los hombres que sufren de prostatitis crónica. Evitaría el calor excesivo en el contexto de la prostatitis aguda.  ? Puede usar medicamentos de venta maile para controlar el dolor, a menos que le hayan dado otro medicamento. El ibuprofeno suele ser un analgésico antiinflamatorio muy eficaz. Puede marco 400 mg dos veces al día nile 1 a 2 semanas independientemente de los síntomas y según sea necesario para las molestias posteriores. Si tiene sylvester enfermedad hepática o renal crónica, hable con whatley proveedor de atención médica antes de marco estos medicamentos. También hable con whatley proveedor si alguna vez ha tenido sylvester úlcera estomacal o sangrado GI.  ? Orine con  frecuencia, no se sostenga la vejiga.  ? Si tiene síntomas jaun flujo débil, whatley médico puede recetarle un bloqueador pedro jaun tamsulosina (flomax).  ? Asegúrese de que los problemas de dolor crónico estén sanjay controlados, especialmente los problemas de dolor de espalda, ya que hillary es un factor de riesgo significativo para la prostatitis/dolor pélvico. Hable con whatley médico de atención primaria o especialista en espalda si whatley dolor no está bajo control adecuado.  ? El estreñimiento causa esfuerzo y dolor. Evite el estreñimiento comiendo laxantes naturales jaun ciruelas pasas, frutas frescas y cereales integrales. Si es necesario, use un laxante suave de venta maile (OTC) para el estreñimiento. Se puede usar un ablandador de heces de venta maile, jaun Colace, para mantener las heces blandas.  ? Si el sexo es incómodo o doloroso, evítelo hasta que los síntomas mejoren. Sin embargo, existe cierta evidencia de que la eyaculación regular en realidad puede ayudar a mejorar los síntomas de la prostatitis. Puede tener relaciones sexuales si se siente sanjay.

## 2025-04-01 NOTE — PROGRESS NOTES
HPI:     Jan kaufman is a 74 year old male with hx of T2DM, HTN, and HLD, who presents to the office today for dysuria.     Daughter and son present during visit, pt prefers for family to translate (Malawian).    C/o dysuria, some Left flank pain that eventually became more generalized and more noticeable after urination.   Onset: early Feb 2025    Pt saw PCP on 02/24/25 for these symptoms, at which time the abdominal pain was less intense. Denied F/C or lethargy. Up til that point, pt was trying to alleviate symptoms by increasing water intake and abstaining from coffee UA +trace blood but neg for nitrites and leuks. Was prescribed cipro 500mg BID x7 day and pyridium 200mg PRN for pain.     F/u OV with PCP on 03/06/25. Per encounter note, pt reported persistent pain despite course of abx and NSAIDs. Overall intensity of pain has decreased but still bothersome. Pain mostly occurring at end of urine stream. Not currently having flank/abd pain. Urine neg.     ICC visit on 03/26/25 for urinary freq and dysuria. Urine dip +trace blood but ucx negative. Only pyridium prescribed.     Today, pt reports continued issues with dysuria/burning sensation with urination    Currently:  DF q1.5hrs -- similar to baseline freq  NOC x4 -- similar to baseline freq  FOS: Weak. No difficulty starting. Once in a while stream will stop and go but generally continuous and feels like he is able to empty his bladder each time he voids.   Incontinence: NONE  Fluids: prior to Feb 2025, drank little water, 2 beers/day, 2 cups coffee/day. Since symptom onset, increasing water intake to ~4 bottles a day, 1-2 cups tea instead of coffee.  + dysuria  Does a lot of heavy lifting at work.     Hx of UTI/kidney infections: YES, 2 prior episodes 4 years ago  Hx of stones: NONE  Smoking hx: FORMER, 5 cigarettes a day, smoked for ~30-40 yrs, quit ~10 yrs ago.     Fhx: of stones: YES  Fhx of  malignancy: NONE    UA trace-intact blood, neg  leuks, neg nitrites.   PVR 93mL  AUA SS 11/35: 0/5, 5/5, 2/5, 0/5, 0/5, 0/5, and 4/5. QOL 5/6 unhappy.     Labs:  (03/27/25) Ucx: neg  Most recent A1C: 9.3% (01/27/25, follows with Endocrine)  Most recent Scr 0.59 (08/13/21)  No results found for: \"PSA\"  No results found for: \"PERCENTPSA\"  No results found for: \"PSAS\"  No results found for: \"PSAULTRA\"    HISTORY:  Past Medical History:    DIABETES    Esophageal reflux    Hyperlipidemia    Type II or unspecified type diabetes mellitus without mention of complication, not stated as uncontrolled      Past Surgical History:   Procedure Laterality Date    Colonoscopy      Hernia surgery        Family History   Problem Relation Age of Onset    Diabetes Brother       Social History:   Social History     Socioeconomic History    Marital status:    Tobacco Use    Smoking status: Former    Smokeless tobacco: Never   Substance and Sexual Activity    Alcohol use: No    Drug use: No        Medications (Active prior to today's visit):  Current Outpatient Medications   Medication Sig Dispense Refill    losartan 50 MG Oral Tab Take 1 tablet (50 mg total) by mouth daily.      dapagliflozin (FARXIGA) 10 MG Oral Tab Take 1 tablet (10 mg total) by mouth daily.      METFORMIN HCL 1000 MG Oral Tab Take 1 tablet by mouth twice daily 60 tablet 0    atorvastatin 40 MG Oral Tab Take 1 tablet (40 mg total) by mouth daily. 90 tablet 1    Insulin Degludec (TRESIBA FLEXTOUCH) 200 UNIT/ML Subcutaneous Solution Pen-injector 30 units daily 90 mL 3    phenazopyridine 200 MG Oral Tab Take 1 tablet (200 mg total) by mouth 3 (three) times daily as needed for Pain. (Patient not taking: Reported on 4/1/2025) 6 tablet 0    Lancets Super Thin 28G Does not apply Misc 1 each by Does not apply route 2 (two) times a day. (Patient not taking: Reported on 4/1/2025) 200 each 2    Meloxicam (MOBIC) 7.5 MG Oral Tab Take 1 tablet (7.5 mg total) by mouth daily. (Patient not taking: Reported on 4/1/2025) 30  tablet 0    Dulaglutide (TRULICITY) 0.75 MG/0.5ML Subcutaneous Solution Pen-injector Inject 0.75 mg into the skin once a week. (Patient not taking: Reported on 4/1/2025) 10 pen 1    HYDROcodone-acetaminophen 5-325 MG Oral Tab Take 1-2 tablets by mouth every 4 (four) hours as needed for Pain. (Patient not taking: Reported on 4/1/2025) 12 tablet 0    TraMADol HCl 50 MG Oral Tab Take 50 mg by mouth every 6 (six) hours as needed for Pain. 1 tablet by mouth every 6 hours as needed for 10 days (Patient not taking: Reported on 4/1/2025)      Insulin Pen Needle (BD PEN NEEDLE OFELIA U/F) 32G X 4 MM Does not apply Misc 4 times a day (Patient not taking: Reported on 4/1/2025) 300 each 1    Omeprazole 40 MG Oral Capsule Delayed Release Take 1 capsule by mouth once daily. (Patient not taking: Reported on 4/1/2025) 90 capsule 1       Allergies:  Allergies[1]    ROS:     A comprehensive 10 point review of systems was completed.  Pertinent positives and negatives noted in the the HPI.    PHYSICAL EXAM:     GENERAL APPEARANCE: well, developed, well nourished, in no acute distress  NEUROLOGIC: nonfocal, alert and oriented  HEAD: normocephalic, atraumatic  EYES: sclera non-icteric  EARS: hearing intact  ORAL CAVITY: mucosa moist  NECK/THYROID: no obvious goiter or masses  LUNGS: nonlabored breathing  ABDOMEN: soft, no obvious masses or tenderness  LOS: declined/deferred  SKIN: no obvious rashes     ASSESSMENT/PLAN:   Diagnoses and all orders for this visit:    Urine finding  -     URINALYSIS, AUTO, W/O SCOPE    - currently symptomatic. Will repeat UA with micro in 1-2 months when symptoms subsided to reassess for blood in urine. If positive, will need additional workup for microhematuria.    Dysuria  Nocturia  UTI vs prostatitis  Weak stream  - will send out urine for PCR testing today to confirm if infection still present; need for additional abx will depend on results; pt and family agreeable  - discussed conservative mgmt for  symptoms including warm baths, NSAIDs PRN, avoiding sitting for prolonged periods of time, application of heat/ice packs to affected area with barrier between pack and skin, avoidance of bladder irritants, and pushing fluids. Advised pt that it could take 4-6 wks for inflammation to completely subside.   - reviewed behavioral changes for nocturia including stopping fluids 3 hrs before bed, avoiding bladder irritants later in the day, emptying bladder just before going to bed, etc.   - pt declines need for alpha-blockers at this time. Emptying bladder well. Advised pt/family to notify us if he would like trial of Flomax for urine stream/LUTS  - no baseline PSA but pt concerned for possible prostate cancer given symptoms. Reassured patient symptoms unlikely to be related to possible malignancy but advised to wait 1-2 months for PSA testing to allow time for symptoms to subside (as prostatitis is suspected)    Follow-up: in 1-2 months if symptoms not improving.   Depending on results of future PSA and urine micro, may need follow up to discuss results, next steps.    BRITT Escalona  Department of Urology  [facility]          [1] No Known Allergies

## 2025-04-04 NOTE — TELEPHONE ENCOUNTER
EM PCR urine report received and reviewed 04/04/25 at 1447. Negative. [facility] sent, no additional actions at this time.

## 2025-04-09 ENCOUNTER — APPOINTMENT (OUTPATIENT)
Dept: INTERNAL MEDICINE | Age: 75
End: 2025-04-09

## 2025-04-09 VITALS
OXYGEN SATURATION: 94 % | BODY MASS INDEX: 33.14 KG/M2 | HEIGHT: 63 IN | SYSTOLIC BLOOD PRESSURE: 137 MMHG | WEIGHT: 187.06 LBS | DIASTOLIC BLOOD PRESSURE: 74 MMHG | HEART RATE: 70 BPM

## 2025-04-09 DIAGNOSIS — R05.3 CHRONIC COUGH: Primary | ICD-10-CM

## 2025-04-09 DIAGNOSIS — L30.9 ECZEMA, UNSPECIFIED TYPE: ICD-10-CM

## 2025-04-09 DIAGNOSIS — E11.42 TYPE 2 DIABETES MELLITUS WITH PERIPHERAL NEUROPATHY (CMD): ICD-10-CM

## 2025-04-09 PROCEDURE — 3078F DIAST BP <80 MM HG: CPT | Performed by: INTERNAL MEDICINE

## 2025-04-09 PROCEDURE — 99214 OFFICE O/P EST MOD 30 MIN: CPT | Performed by: INTERNAL MEDICINE

## 2025-04-09 PROCEDURE — 3075F SYST BP GE 130 - 139MM HG: CPT | Performed by: INTERNAL MEDICINE

## 2025-04-09 RX ORDER — TRIAMCINOLONE ACETONIDE 1 MG/G
1 CREAM TOPICAL 2 TIMES DAILY
Qty: 80 G | Refills: 1 | Status: SHIPPED | OUTPATIENT
Start: 2025-04-09

## 2025-04-09 RX ORDER — SULFAMETHOXAZOLE AND TRIMETHOPRIM 800; 160 MG/1; MG/1
1 TABLET ORAL 2 TIMES DAILY
Qty: 14 TABLET | Refills: 0 | Status: SHIPPED | OUTPATIENT
Start: 2025-04-09 | End: 2025-04-16

## 2025-04-09 RX ORDER — FLUTICASONE PROPIONATE 50 MCG
2 SPRAY, SUSPENSION (ML) NASAL DAILY
Qty: 16 G | Refills: 1 | Status: SHIPPED | OUTPATIENT
Start: 2025-04-09

## 2025-04-09 ASSESSMENT — ENCOUNTER SYMPTOMS
HEMATOLOGIC/LYMPHATIC NEGATIVE: 1
ENDOCRINE NEGATIVE: 1
NEUROLOGICAL NEGATIVE: 1
PSYCHIATRIC NEGATIVE: 1
COUGH: 1
GASTROINTESTINAL NEGATIVE: 1
EYES NEGATIVE: 1
ALLERGIC/IMMUNOLOGIC NEGATIVE: 1

## 2025-05-19 ENCOUNTER — APPOINTMENT (OUTPATIENT)
Dept: INTERNAL MEDICINE | Age: 75
End: 2025-05-19

## 2025-07-14 ENCOUNTER — APPOINTMENT (OUTPATIENT)
Dept: FAMILY MEDICINE | Age: 75
End: 2025-07-14

## 2025-07-14 VITALS
HEIGHT: 63 IN | DIASTOLIC BLOOD PRESSURE: 76 MMHG | HEART RATE: 65 BPM | SYSTOLIC BLOOD PRESSURE: 136 MMHG | OXYGEN SATURATION: 97 % | WEIGHT: 176.81 LBS | BODY MASS INDEX: 31.33 KG/M2

## 2025-07-14 DIAGNOSIS — E78.2 MIXED HYPERLIPIDEMIA: ICD-10-CM

## 2025-07-14 DIAGNOSIS — E11.9 TYPE 2 DIABETES MELLITUS WITHOUT COMPLICATION, WITH LONG-TERM CURRENT USE OF INSULIN (CMD): Primary | ICD-10-CM

## 2025-07-14 DIAGNOSIS — Z79.4 TYPE 2 DIABETES MELLITUS WITHOUT COMPLICATION, WITH LONG-TERM CURRENT USE OF INSULIN (CMD): Primary | ICD-10-CM

## 2025-07-14 DIAGNOSIS — I10 ESSENTIAL HYPERTENSION: ICD-10-CM

## 2025-07-14 DIAGNOSIS — Z11.59 NEED FOR HEPATITIS C SCREENING TEST: ICD-10-CM

## 2025-07-14 LAB — HBA1C MFR BLD: 10.3 % (ref 4.5–5.6)

## 2025-07-14 PROCEDURE — 82043 UR ALBUMIN QUANTITATIVE: CPT | Performed by: CLINICAL MEDICAL LABORATORY

## 2025-07-14 PROCEDURE — 82570 ASSAY OF URINE CREATININE: CPT | Performed by: CLINICAL MEDICAL LABORATORY

## 2025-07-15 LAB
CREAT UR-MCNC: 62.9 MG/DL
MICROALBUMIN UR-MCNC: 4.17 MG/DL
MICROALBUMIN/CREAT UR: 66.3 MG/G

## 2025-10-07 ENCOUNTER — APPOINTMENT (OUTPATIENT)
Dept: INTERNAL MEDICINE | Age: 75
End: 2025-10-07

## (undated) NOTE — MR AVS SNAPSHOT
[address]  [phone]               Thank you for choosing us for your health care visit with Shine Echevarria PT. We are glad to serve you and happy to provide you with this summary of your visit.   Please he the building. For security purposes, please check in with the reception staff at every visit.                                           Jun 22, 2017  3:45 PM   PT VISIT BY THERAPIST with Bang Easton PT   THE [facility] Physical Therapy in [facility] ([address] Se[address] No Known Allergies                   Current Medications          This list is accurate as of: 6/15/17  5:21 PM.  Always use your most recent med list.                Cyclobenzaprine HCl 10 MG Tabs   Take 1 tablet (10 mg total) by mouth nightly.    Commonl

## (undated) NOTE — MR AVS SNAPSHOT
[address]  [phone]               Thank you for choosing us for your health care visit with Alistair Bruno PT. We are glad to serve you and happy to provide you with this summary of your visit.   Please he the building. For security purposes, please check in with the reception staff at every visit.                                           Jun 13, 2017  3:45 PM   PT VISIT BY THERAPIST with Modesta Godinez PT   THE [facility] OF [facility] Physical Therapy in [facility] ([place] Sev inside the [employer], at [facility] (enter via [facility]). Convenient parking is available in the parking lot in front of the [employer].   When you enter the [employer], please check in with the Expires: 7/30/2017 11:00 AM    If you have questions, you can call [phone] to talk to our [facility] Staff. Remember, [employer] is NOT to be used for urgent needs. For medical emergencies, dial 911.            Visit [employer]

## (undated) NOTE — MR AVS SNAPSHOT
[address] [phone]               Thank you for choosing us for your health care visit with Lanny Quintanilla PT. We are glad to serve you and happy to provide you with this summary of your visit.   Please he the building. For security purposes, please check in with the reception staff at every visit.                                           Todd 15, 2017  3:45 PM   PT VISIT BY THERAPIST with Parvin Leiva PT   THE [facility] Physical Therapy in [facility] (ED[place] Sev inside the [employer], at [facility] (enter via [facility]). Convenient parking is available in the parking lot in front of the [facility].   When you enter the [facility], please check in with the Expires: 7/30/2017 11:00 AM    If you have questions, you can call [phone] to talk to our [facility] Staff. Remember, [employer] is NOT to be used for urgent needs. For medical emergencies, dial 911.            Visit [employer]

## (undated) NOTE — MR AVS SNAPSHOT
[address] [phone]               Thank you for choosing us for your health care visit with Valery Pastor PT. We are glad to serve you and happy to provide you with this summary of your visit.   Please he the building. For security purposes, please check in with the reception staff at every visit.                                           Jun 28, 2017 11:15 AM   [employer] OP with MD [address] [facility] Support Staff. Remember, MyChart is NOT to be used for urgent needs. For medical emergencies, dial 911.            Visit [facility] online at  [email or web address]

## (undated) NOTE — MR AVS SNAPSHOT
[address] [phone]               Thank you for choosing us for your health care visit with Ki Boyce PT. We are glad to serve you and happy to provide you with this summary of your visit.   Please he the building. For security purposes, please check in with the reception staff at every visit.                                           Jun 19, 2017  3:15 PM   PT VISIT BY THERAPIST with Sara Schafer PT   THE [facility] Physical Therapy in [facility] (EDW Sev inside the [employer], at [facility] (enter via [facility]). Convenient parking is available in the parking lot in front of the [employer].   When you enter the [employer], please check in with the Expires: 7/30/2017 11:00 AM    If you have questions, you can call [phone] to talk to our [facility] Staff. Remember, [employer] is NOT to be used for urgent needs. For medical emergencies, dial 911.            Visit [employer]

## (undated) NOTE — MR AVS SNAPSHOT
[address]  [phone]               Thank you for choosing us for your health care visit with Joao Lopez PT. We are glad to serve you and happy to provide you with this summary of your visit.   Please he the building. For security purposes, please check in with the reception staff at every visit.                                           Jun 12, 2017  4:00 PM   PT VISIT BY THERAPIST with Juan Melchor PT   THE [facility] OF [facility] Physical Therapy in [facility] (EDW Sev inside the [employer], at [facility] (enter via [facility]). Convenient parking is available in the parking lot in front of the [employer].   When you enter the [employer], please check in with the Enter your [employer] Activation Code exactly as it appears below along with your Zip Code and Date of Birth to complete the sign-up process. If you do not sign up before the expiration date, you must request a new code.     Your unique [employer] Access Code: GN

## (undated) NOTE — ED AVS SNAPSHOT
Stanley Galdamez   MRN: [id number]    Department:  [facility] Emergency Department   Date of Visit:  1/22/2018           Disclosure     Insurance plans vary and the physician(s) referred by the ER may not be covered by your plan.  Please contact yo tell this physician (or your personal doctor if your instructions are to return to your personal doctor) about any new or lasting problems. The primary care or specialist physician will see patients referred from the [facility] Emergency Department.  Arthor Bernheim